# Patient Record
Sex: MALE | Race: WHITE | Employment: UNEMPLOYED | URBAN - METROPOLITAN AREA
[De-identification: names, ages, dates, MRNs, and addresses within clinical notes are randomized per-mention and may not be internally consistent; named-entity substitution may affect disease eponyms.]

---

## 2021-08-10 ENCOUNTER — TELEPHONE (OUTPATIENT)
Dept: FAMILY MEDICINE CLINIC | Age: 26
End: 2021-08-10

## 2021-08-10 NOTE — TELEPHONE ENCOUNTER
----- Message from [place] sent at 8/10/2021  8:50 AM EDT -----  Regarding: Dr. Jeanette العراقي Message/Vendor Calls    Caller's first and last name: Erma tarun/ [facility]      Reason for call:  Requesting a call back to check status of referral request for the patient and if Dr. Thelma Bradley had an opportunity to review the case and if pt has schedule an appt yet.        Callback required yes/no and why:  yes      Best contact number(s):  [phone]      Details to clarify the request:  Saurabh Esqueda [facility]